# Patient Record
Sex: FEMALE | Race: WHITE | NOT HISPANIC OR LATINO | Employment: OTHER | URBAN - METROPOLITAN AREA
[De-identification: names, ages, dates, MRNs, and addresses within clinical notes are randomized per-mention and may not be internally consistent; named-entity substitution may affect disease eponyms.]

---

## 2018-10-08 ENCOUNTER — PATIENT OUTREACH (OUTPATIENT)
Dept: HEALTH INFORMATION MANAGEMENT | Facility: OTHER | Age: 83
End: 2018-10-08

## 2019-08-14 ENCOUNTER — HOSPITAL ENCOUNTER (EMERGENCY)
Facility: MEDICAL CENTER | Age: 84
End: 2019-08-14
Attending: EMERGENCY MEDICINE
Payer: MEDICARE

## 2019-08-14 VITALS
OXYGEN SATURATION: 97 % | DIASTOLIC BLOOD PRESSURE: 75 MMHG | BODY MASS INDEX: 18.55 KG/M2 | TEMPERATURE: 97 F | HEIGHT: 62 IN | SYSTOLIC BLOOD PRESSURE: 158 MMHG | HEART RATE: 105 BPM | RESPIRATION RATE: 18 BRPM

## 2019-08-14 DIAGNOSIS — C50.912 MALIGNANT NEOPLASM OF LEFT FEMALE BREAST, UNSPECIFIED ESTROGEN RECEPTOR STATUS, UNSPECIFIED SITE OF BREAST (HCC): ICD-10-CM

## 2019-08-14 DIAGNOSIS — Z51.5 ENCOUNTER FOR PALLIATIVE CARE IN HOME HOSPICE: ICD-10-CM

## 2019-08-14 PROCEDURE — 36415 COLL VENOUS BLD VENIPUNCTURE: CPT

## 2019-08-14 PROCEDURE — 99285 EMERGENCY DEPT VISIT HI MDM: CPT

## 2019-08-14 ASSESSMENT — LIFESTYLE VARIABLES: DO YOU DRINK ALCOHOL: YES

## 2019-08-14 NOTE — DISCHARGE PLANNING
Spoke with daughter Janiya about Hospice seeing patient at home as can not come to ER tonight.  Family ok with this.  Family informed patient may not be accepted by hospice.  They are aware of this.  Janiya number [phone].  Trying to get patient an appt earlier

## 2019-08-14 NOTE — ED NOTES
Pt brought back to rm BL 14 from triage by WC. Pt able to transfer self to bed, pt in gown, on monitor, family at bedside, call light in reach. Chart up for ERP. New dressing applied to L breast.

## 2019-08-14 NOTE — DISCHARGE PLANNING
Received Choice form at 1500  Agency/Facility Name: [facility]  Referral sent per Choice form @ 7480

## 2019-08-14 NOTE — ED NOTES
Med rec updated and complete. Allergies reviewed. Pt denies taking medications.  No antibiotic use in last 14 days.  Home pharmacy [facility].

## 2019-08-14 NOTE — ED TRIAGE NOTES
.  Chief Complaint   Patient presents with   • Lightheadedness   • Dizziness   Pt BIB family from home.  Per family there has been increased bleeding from Pt's left breast, starting this morning.  Pt c/o dizziness/light headedness.  Family reports similar episode earlier this week - bleeding was controlled.  Pt has malignant tumor on left breast.

## 2019-08-14 NOTE — ED PROVIDER NOTES
CHIEF COMPLAINT  Chief Complaint   Patient presents with   • Lightheadedness   • Dizziness       HPI  Gaby Lewis is a 87 y.o. female who presents patient of bleeding from left breast malignancy and dizziness.    History is obtained from the daughter who is at bedside because the patient is really unable to provide much history.    The patient was diagnosed with breast cancer in 1985 and elected to forego any treatment at that time.  She did not receive resection, chemotherapy or radiation therapy and she is been steadfast in her determination to not receive treatment for this entire time for about 34 years.       She has not been doing well over the last year.  Several months ago she developed severe back pain which was acute.  She is been dealing with that pain.  Still having headaches.  It is generally doing poorly.  She had a slow decline in her appetite.  She is been able to walk around and take care of herself and she has not been falling.  She is had a long-standing open wound on her left breast and occasions begin bleeding.  The daughter has not seen in years but she states it looks terrible and it began bleeding today.  She called her doctor to see what they should do and they told her to bring her to the emergency department so she is here for an evaluation.    On evaluation is no longer bleeding.  I had a lengthy discussion with the daughter about this and she would like a referral to hospice.  The patient has been adamant about not wanting care.  And they would like to monitor this wish.  The patient and family have no other acute concerns or complaints at this time    REVIEW OF SYSTEMS  See HPI for further details.  Unable to obtain from the patient because of her mental status.    PAST MEDICAL HISTORY  Past Medical History:   Diagnosis Date   • Cancer (HCC) 2015    Left Breast CA   • Cataract    • Dental disorder     Full set dentures   • High cholesterol    • Hyperlipidemia     Was told  she had high cholesterol in 1980s, no medication       FAMILY HISTORY  Family History   Problem Relation Age of Onset   • Heart Disease Father    • Hypertension Father    • Cancer Brother         prostate   • Lung Disease Neg Hx    • Diabetes Neg Hx    • Hyperlipidemia Neg Hx    • Stroke Neg Hx    • Alcohol/Drug Neg Hx        SOCIAL HISTORY  Social History     Socioeconomic History   • Marital status:      Spouse name: Not on file   • Number of children: 2   • Years of education: Not on file   • Highest education level: Not on file   Occupational History   • Not on file   Social Needs   • Financial resource strain: Not on file   • Food insecurity:     Worry: Not on file     Inability: Not on file   • Transportation needs:     Medical: Not on file     Non-medical: Not on file   Tobacco Use   • Smoking status: Never Smoker   • Smokeless tobacco: Never Used   Substance and Sexual Activity   • Alcohol use: Not Currently     Alcohol/week: 4.2 oz     Types: 7 Glasses of wine per week     Comment: 2-3 per week (wine)   • Drug use: No   • Sexual activity: Never   Lifestyle   • Physical activity:     Days per week: Not on file     Minutes per session: Not on file   • Stress: Not on file   Relationships   • Social connections:     Talks on phone: Not on file     Gets together: Not on file     Attends [employer] service: Not on file     Active member of club or organization: Not on file     Attends meetings of clubs or organizations: Not on file     Relationship status: Not on file   • Intimate partner violence:     Fear of current or ex partner: Not on file     Emotionally abused: Not on file     Physically abused: Not on file     Forced sexual activity: Not on file   Other Topics Concern   • Not on file   Social History Narrative    No known exposure to asbestos, dyes, chemicals, or pesticides. . Had 2 children, 1 passed away.        SURGICAL HISTORY  Past Surgical History:   Procedure Laterality Date   •  "CATARACT PHACO WITH IOL Right 8/1/2016    Procedure: CATARACT PHACO WITH IOL;  Surgeon: Paul Rodríguez M.D.;  Location: SURGERY SURGICAL [facility]S;  Service:    • CATARACT PHACO WITH IOL Left 5/23/2016    Procedure: CATARACT PHACO WITH IOL W/EXTRA CAPSULAR EXTRACTION;  Surgeon: Paul Rodríguez M.D.;  Location: SURGERY SURGICAL [facility];  Service:    • EAR MIDDLE EXPLORATION Bilateral 1983    Age 52   • HYSTERECTOMY, VAGINAL  1970's    fibroids, no cancer or abnormal pap       CURRENT MEDICATIONS  Home Medications     Reviewed by Renea Alcaraz (Pharmacy Tech) on 08/14/19 at 1605  Med List Status: Complete   Medication Last Dose Status        Patient Devin Taking any Medications                       ALLERGIES  No Known Allergies    PHYSICAL EXAM  VITAL SIGNS: /74   Pulse (!) 135   Temp 36.1 °C (97 °F) (Temporal)   Resp 18   Ht 1.575 m (5' 2\")   SpO2 97%   BMI 18.55 kg/m²      Constitutional: Awake ill-appearing no acute cardia pulmonary distress  HENT: Normocephalic, Atraumatic, Bilateral external ears normal, dry..   Eyes: PERRL, EOMI, Conjunctiva normal, No discharge.   Neck: Normal range of motion, No tenderness, Supple, No stridor.     Cardiovascular:Tachycardic no murmurs or rubs..   Thorax & Lungs: Normal breath sounds, No respiratory distress, No wheezing, large ulcerative wound on the left breast.  Not actively bleeding.  There is surrounding induration and fullness but does not appear infected per  Abdomen: Bowel sounds normal, Soft, No tenderness,  Skin: Warm, Dry, No erythema, No rash.   Musculoskeletal: Good range of motion in all major joints.   Neurologic: Alert, at baseline per family  Psychiatric: Affect normal        COURSE & MEDICAL DECISION MAKING  Pertinent Labs & Imaging studies reviewed. (See chart for details)      The patient was seen and examined in a broad official diagnosis was considered.    The patient is generally declining really unable to communicate much of her " symptoms or complaints at this time.  The majority of the history is obtained from the daughter.    The daughter wants the patient to receive hospice care.  This is consistent with the patient's long-standing wishes.  We have discussed the possibility of a simple etiology collectively abnormality or UTI.  We also discussed that these might require invasive procedures like a blood draw, or catheterized urine.  We discussed that this would require further intervention with medications or possibly fluids to correct these abnormalities and this may require admission to her prescriptions for home.    The patient's daughter states the patient wants hospice and would not want invasive procedures, she would not want hospitalization and she would not want to be on medications.    Since being diagnosed with cancer 34 years ago the patient has made it clear that when the time comes her she has declined she would like to be made comfortable and not have interventions, or medications.  She would not like any effort to prolong her life.  The daughter is able to clearly articulate this.      I spoke with case coordination who is arranged outpatient hospice home visit tomorrow.  We will see her tomorrow to see if she is a candidate for hospice.  She is been having intermittent bleeding from her breast wound.  The patient would not want blood transfusions would not want procedures to minimize bleeding so we will we will treat this as minimally invasive as possible with applying direct pressure and making her comfortable.  The nursing staff is taught the family had to do this.      The family understands that her pain in her back is likely related to metastatic cancer since she had cancer for several years.  They state the patient does not want any further intervention other comfortable going home with the plan of being on hospice.  They will see their primary care doctor.  The case coordinate I will try to make this a sooner  follow-up visit to make sure she has appropriate arrangement of hospice.    We will honor patient's wishes by doing no interventions.  Measuring her go home.  Go on hospice.  Patient and family are discharged.  Prognosis is very poor as known by the family    The patient was noted to have elevated blood pressure while in the ER and was counseled to see their doctor within one wee to have this rechecked.    FINAL IMPRESSION  1. Malignant neoplasm of left female breast, unspecified estrogen receptor status, unspecified site of breast (HCC)  REFERRAL TO HOSPICE   2. Encounter for palliative care in home hospice       2.   3.         Electronically signed by: Eliazar Hernandez, 8/14/2019 5:14 PM

## 2019-08-14 NOTE — ED NOTES
Report received from Sadie LOPEZ, assumed care of patient.  Call light and belongings within reach.  Bed in lowest position.

## 2019-08-15 ENCOUNTER — TELEPHONE (OUTPATIENT)
Dept: MEDICAL GROUP | Facility: PHYSICIAN GROUP | Age: 84
End: 2019-08-15

## 2019-08-15 NOTE — ED NOTES
All lines and monitors disconnected. Discharge instructions reviewed, questions answered.  Pt to lobby via wheelchair, escorted by RN.  Pt states all belongings in possession.

## 2019-08-15 NOTE — TELEPHONE ENCOUNTER
Future Appointments       Provider Department Center    8/16/2019 2:20 PM Alise Cerda D.O. [facility]        ESTABLISHED PATIENT PRE-VISIT PLANNING     Patient was NOT contacted to complete PVP.     Note: Patient will not be contacted if there is no indication to call.     1.  Reviewed notes from the last few office visits within the medical group: Yes    2.  If any orders were placed at last visit or intended to be done for this visit (i.e. 6 mos follow-up), do we have Results/Consult Notes?        •  Labs - Labs were not ordered at last office visit.   Note: If patient appointment is for lab review and patient did not complete labs, check with provider if OK to reschedule patient until labs completed.       •  Imaging - Imaging was not ordered at last office visit.       •  Referrals - No referrals were ordered at last office visit.    3. Is this appointment scheduled as a Hospital Follow-Up? No    4.  Immunizations were updated in Epic using WebIZ?: No WebIZ record       •  Web Iz Recommendations: PREVNAR (PCV13) , TDAP and SHINGRIX (Shingles)    5.  Patient is due for the following Health Maintenance Topics:   Health Maintenance Due   Topic Date Due   • Annual Wellness Visit  12/09/[birth year]31   • IMM DTaP/Tdap/Td Vaccine (1 - Tdap) 12/09/1950   • PAP SMEAR  12/09/1952   • COLONOSCOPY  12/09/1981   • IMM ZOSTER VACCINES (1 of 2) 12/09/1981   • BONE DENSITY  12/09/1996   • IMM PNEUMOCOCCAL VACCINE: 65+ Years (1 of 2 - PCV13) 12/09/1996   • MAMMOGRAM  09/30/2016       6. Orders for overdue Health Maintenance topics pended in Pre-Charting? YES    7.  AHA (MDX) form printed for Provider? YES    8.  Patient was informed to arrive 15 min prior to their scheduled appointment and bring in their medication bottles.

## 2019-08-15 NOTE — DISCHARGE PLANNING
Return call from Pinky with [facility] and she will follow up with pt tomorrow. She is aware pt does not have PCP.

## 2019-08-16 ENCOUNTER — OFFICE VISIT (OUTPATIENT)
Dept: MEDICAL GROUP | Facility: PHYSICIAN GROUP | Age: 84
End: 2019-08-16
Payer: MEDICARE

## 2019-08-16 VITALS
HEART RATE: 119 BPM | DIASTOLIC BLOOD PRESSURE: 56 MMHG | SYSTOLIC BLOOD PRESSURE: 110 MMHG | HEIGHT: 62 IN | TEMPERATURE: 99.4 F | BODY MASS INDEX: 18.55 KG/M2 | OXYGEN SATURATION: 89 %

## 2019-08-16 DIAGNOSIS — R63.0 POOR APPETITE: ICD-10-CM

## 2019-08-16 DIAGNOSIS — C50.919 MALIGNANT NEOPLASM OF FEMALE BREAST, UNSPECIFIED ESTROGEN RECEPTOR STATUS, UNSPECIFIED LATERALITY, UNSPECIFIED SITE OF BREAST (HCC): ICD-10-CM

## 2019-08-16 DIAGNOSIS — M54.5 LOW BACK PAIN, UNSPECIFIED BACK PAIN LATERALITY, UNSPECIFIED CHRONICITY, WITH SCIATICA PRESENCE UNSPECIFIED: ICD-10-CM

## 2019-08-16 PROCEDURE — 99203 OFFICE O/P NEW LOW 30 MIN: CPT | Performed by: FAMILY MEDICINE

## 2019-08-16 ASSESSMENT — PATIENT HEALTH QUESTIONNAIRE - PHQ9: CLINICAL INTERPRETATION OF PHQ2 SCORE: 0

## 2019-08-16 NOTE — PROGRESS NOTES
CC: Breast cancer    HISTORY OF THE PRESENT ILLNESS: Patient is a 87 y.o. female. This pleasant patient is here today to establish care and discuss health problems below.    Patient is here today to establish care.  She is present with daughter and son-in-law.  She is wheelchair-bound and very ill.  This is actually more of a formality for her insurance purposes she is going to enter hospice next week and is supposed to have a primary care doctor.    The patient was diagnosed with breast cancer in 1985.  At that time she declined any treatment.  Over the years she is been doing fairly well with holistic treatments and has lived quite a long life.  However, in the past year or so, especially last 5 months patient has declined fairly rapidly.  She has a gaping wound on her left breast where the cancer is at.  She was recently seen in the emergency department for this because they could not get it to stop bleeding.  She also has significant low back pain which is new as well as headaches and confusion. She also has a very poor appetite and does not eat much.     When she was seen at the emergency department 2 days ago, they had an extended discussion with the physician there and patient desires no invasive interventions whatsoever.  She was referred to hospice at that time, and she plans to get set up with hospice by hopefully Monday.  They plan to bring a bed to daughter's home so that she can receive home hospice.    Today patient is quite interactive.  She states she does not want CPR, IV, antibiotics, labs, x-ray imaging.  She states that she would like to do something for her pain.  Daughter does not yet want her to take pain medication until they are set up with a bed upstairs.  Currently patient lives in a downstairs apartment and does ambulate on her own.  Daughter concerned that with pain medication, patient would have a serious fall until they can get her set up with hospice upstairs where they  live.    Daughter states she does have advanced directives but there may be 30 years old and they want to update them.  However, she feels are fairly consistent with what she desires now which is essentially no intervention and comfort care only.    Allergies: Patient has no known allergies.    No current [facility]-ordered outpatient medications on file.     No current [facility]-ordered facility-administered medications on file.        Past Medical History:   Diagnosis Date   • Cancer (HCC) 2015    Left Breast CA   • Cataract    • Dental disorder     Full set dentures   • High cholesterol    • Hyperlipidemia     Was told she had high cholesterol in 1980s, no medication       Past Surgical History:   Procedure Laterality Date   • CATARACT PHACO WITH IOL Right 8/1/2016    Procedure: CATARACT PHACO WITH IOL;  Surgeon: Paul Rodríguez M.D.;  Location: [facility]ERY [facility];  Service:    • CATARACT PHACO WITH IOL Left 5/23/2016    Procedure: CATARACT PHACO WITH IOL W/EXTRA CAPSULAR EXTRACTION;  Surgeon: Paul Rodríguez M.D.;  Location: [facility]GERY SURG[facility];  Service:    • EAR MIDDLE EXPLORATION Bilateral 1983    Age 52   • HYSTERECTOMY, VAGINAL  1970's    fibroids, no cancer or abnormal pap       Social History     Tobacco Use   • Smoking status: Never Smoker   • Smokeless tobacco: Never Used   Substance Use Topics   • Alcohol use: Yes     Alcohol/week: 4.2 oz     Types: 7 Glasses of wine per week     Comment: 2-3 per week (wine)   • Drug use: No       Social History     Social History Narrative    No known exposure to asbestos, dyes, chemicals, or pesticides. . Had 2 children, 1 passed away.        Family History   Problem Relation Age of Onset   • Heart Disease Father    • Hypertension Father    • Cancer Brother         prostate   • Lung Disease Neg Hx    • Diabetes Neg Hx    • Hyperlipidemia Neg Hx    • Stroke Neg Hx    • Alcohol/Drug Neg Hx        ROS:   See HPI    Exam: /56 (BP Location: Right arm,  "Patient Position: Sitting, BP Cuff Size: Adult)   Pulse (!) 119   Temp 37.4 °C (99.4 °F) (Temporal)   Ht 1.575 m (5' 2\")   SpO2 89%  Body mass index is 18.55 kg/m².    General: Cachectic, chronically ill-appearing  Pulmonary: Clear to ausculation.  Normal effort. No rales, ronchi, or wheezing.  Cardiovascular: Tachycardic but regular rhythm.  No murmurs rubs or gallops.  Musculoskeletal:  No extremity cyanosis, clubbing, or edema.  Psych: Interactive and capable of making decisions during today's encounter.      Please note that this dictation was created using voice recognition software. I have made every reasonable attempt to correct obvious errors, but I expect that there are errors of grammar and possibly content that I did not discover before finalizing the note.      Assessment/Plan  Gaby was seen today for establish care and wound check.    Diagnoses and all orders for this visit:    Malignant neoplasm of female breast, unspecified estrogen receptor status, unspecified laterality, unspecified site of breast (HCC)  Low back pain, unspecified back pain laterality, unspecified chronicity, with sciatica presence unspecified  Poor appetite  Chronic uncontrolled issues for the patient.  I suspect she has diffusely metastatic disease based on her complaints.  She has been declining very significantly.  Today she is tachycardic and mildly hypoxic.  She wants no further interventions.  They have talked with [facility] after referral was placed and will plan to set her up on Monday for hospice care at home.  They do have advanced directives in place and daughter is DURABLE POWER OF .  In the event that anything happens over the weekend, have had them fill out a POLST form as well today, as patient is communicative and able to express her wishes today.  Daughter would like to stay away from pain medication even though she has pretty significant back pain due to likely bony metastatic disease, at least " until he can get her set up appropriately in their house as per HPI.  She declines any other bothersome symptoms I am actually other than loss of appetite.  They will contact me if they need anything further.    Follow up as needed.     Counseling: Patient was seen for a total of 30 minutes face-to-face by myself, with more than half of the time spent discussing patient's wishes, filling out POLST form, or discussing options for pain management.    Alise Cerda,   [facility]

## 2019-08-20 ENCOUNTER — TELEPHONE (OUTPATIENT)
Dept: MEDICAL GROUP | Facility: PHYSICIAN GROUP | Age: 84
End: 2019-08-20

## 2019-08-20 DIAGNOSIS — C50.919 METASTATIC BREAST CANCER: ICD-10-CM

## 2019-08-20 NOTE — TELEPHONE ENCOUNTER
1. Caller Name: Janiya Mueller Daughter                                         Call Back Number: [phone]      Patient approves a detailed voicemail message: yes    patient's daughter called states that she was seen last friday 8/16/19. she wasn't aware that she would need a referral for hospice. She would like a referral for [facility]. please let me know if you want to see here again or just do the referral. thank you.

## 2021-01-11 DIAGNOSIS — Z23 NEED FOR VACCINATION: ICD-10-CM

## 2021-03-04 NOTE — PROGRESS NOTES
Outcome: Left Message    Please transfer to Patient Outreach Team at [phone] when patient returns call.      Attempt # 4  
Outcome: Left Message    Please transfer to Patient Outreach Team at [phone] when patient returns call.    WebIZ Checked & Epic Updated:  yes    [facility] Verified: yes    Attempt # 1        
Outcome: Left Message    Please transfer to Patient Outreach Team at [phone] when patient returns call.      Attempt # 3      
Outcome: Left Message    Please transfer to Patient Outreach Team at [phone] when patient returns call.    Attempt # 2        
Home